# Patient Record
Sex: FEMALE | Race: WHITE | NOT HISPANIC OR LATINO | Employment: OTHER | URBAN - METROPOLITAN AREA
[De-identification: names, ages, dates, MRNs, and addresses within clinical notes are randomized per-mention and may not be internally consistent; named-entity substitution may affect disease eponyms.]

---

## 2017-06-20 ENCOUNTER — TELEPHONE (OUTPATIENT)
Dept: CARDIOLOGY | Facility: CLINIC | Age: 72
End: 2017-06-20

## 2017-06-20 NOTE — TELEPHONE ENCOUNTER
"Patient's  called with complaints of patient becoming more dizzy over the last couple of days. She feels like her pacemaker is \"messed\" up. I explained that the patient had a normal pacemaker check at her last office visit. Her device was re-programmed by the [facility] to the [facility] device rep. I have asked one of the nurses in our device clinic to give the  a call to possibly set the patient up to have her device checked.  "